# Patient Record
Sex: MALE | Race: WHITE
[De-identification: names, ages, dates, MRNs, and addresses within clinical notes are randomized per-mention and may not be internally consistent; named-entity substitution may affect disease eponyms.]

---

## 2022-05-10 ENCOUNTER — HOSPITAL ENCOUNTER (EMERGENCY)
Dept: HOSPITAL 61 - ER | Age: 42
Discharge: HOME | End: 2022-05-10
Payer: SELF-PAY

## 2022-05-10 VITALS — HEIGHT: 74 IN | BODY MASS INDEX: 28.12 KG/M2 | WEIGHT: 219.14 LBS

## 2022-05-10 VITALS — DIASTOLIC BLOOD PRESSURE: 92 MMHG | SYSTOLIC BLOOD PRESSURE: 151 MMHG

## 2022-05-10 DIAGNOSIS — Z20.2: ICD-10-CM

## 2022-05-10 DIAGNOSIS — R30.0: Primary | ICD-10-CM

## 2022-05-10 DIAGNOSIS — R36.9: ICD-10-CM

## 2022-05-10 LAB
BACTERIA #/AREA URNS HPF: (no result) /HPF
RBC #/AREA URNS HPF: (no result) /HPF (ref 0–2)
WBC #/AREA URNS HPF: (no result) /HPF (ref 0–4)

## 2022-05-10 PROCEDURE — 96372 THER/PROPH/DIAG INJ SC/IM: CPT

## 2022-05-10 PROCEDURE — 99283 EMERGENCY DEPT VISIT LOW MDM: CPT

## 2022-05-10 PROCEDURE — 81001 URINALYSIS AUTO W/SCOPE: CPT

## 2022-05-10 PROCEDURE — 87591 N.GONORRHOEAE DNA AMP PROB: CPT

## 2022-05-10 PROCEDURE — 87491 CHLMYD TRACH DNA AMP PROBE: CPT

## 2022-05-10 PROCEDURE — 87086 URINE CULTURE/COLONY COUNT: CPT

## 2022-05-10 NOTE — PHYS DOC
Past Medical History


Past Surgical History:  No Surgical History





General Adult


EDM:


Chief Complaint:  SEXUALLY TRANSMITTED DISEASE





HPI:


HPI:





Patient is a 41  year old male presenting to the ED today complaining of 

dysuria, foul-smelling yellow penile discharge, symptoms began a week ago.  

Patient is in the ED with a new girlfriend who is being seen for STDs as well.





Review of Systems:


Review of Systems:


Constitutional:   Denies fever or chills. []


GI:   Denies abdominal pain, nausea, vomiting, bloody stools or diarrhea. [] 


: Reports dysuria and foul-smelling penile discharge


Musculoskeletal:   Denies back pain or joint pain. [] 


Integument:   Denies rash. [] 


Neurologic:   Denies headache, focal weakness or sensory changes. [] 


Psychiatric:  Denies depression or anxiety. []





Heart Score:


C/O Chest Pain:  N/A


Risk Factors:


Risk Factors:  DM, Current or recent (<one month) smoker, HTN, HLP, family 

history of CAD, obesity.


Risk Scores:


Score 0 - 3:  2.5% MACE over next 6 weeks - Discharge Home


Score 4 - 6:  20.3% MACE over next 6 weeks - Admit for Clinical Observation


Score 7 - 10:  72.7% MACE over next 6 weeks - Early Invasive Strategies





Current Medications:





Current Medications








 Medications


  (Trade)  Dose


 Ordered  Sig/Minesh  Start Time


 Stop Time Status Last Admin


Dose Admin


 


 Ceftriaxone Sodium


  (Rocephin Im)  500 mg  1X  ONCE  5/10/22 21:15


 5/10/22 21:16 UNV  





 


 Doxycycline


 Hyclate


  (Vibra-Tab)  100 mg  1X  ONCE  5/10/22 21:15


 5/10/22 21:16 UNV  





 


 Metronidazole


  (Flagyl)  2,000 mg  1X  ONCE  5/10/22 21:15


 5/10/22 21:16 UNV  














Allergies:


Allergies:





Allergies








Coded Allergies Type Severity Reaction Last Updated Verified


 


  No Known Drug Allergies    5/10/22 No











Physical Exam:


PE:





Constitutional: Well developed, well nourished, no acute distress, non-toxic 

appearance. []]


Abdomen: Bowel sounds normal, soft, no tenderness, no masses, no pulsatile 

masses. [] 


Skin: Warm, dry, no erythema, no rash. [] 


Back: No tenderness, no CVA tenderness. [] 


Extremities: No tenderness, no cyanosis, no clubbing, ROM intact, no edema. [] 


Neurologic: Alert and oriented X 3, normal motor function, normal sensory 

function, no focal deficits noted. []


Psychologic: Affect normal, judgement normal, mood normal. []





Current Patient Data:


Vital Signs:





                                   Vital Signs








  Date Time  Temp Pulse Resp B/P (MAP) Pulse Ox O2 Delivery O2 Flow Rate FiO2


 


5/10/22 20:58 98.0 115 20 151/92 (111) 96 Room Air  





 98.0       











EKG:


EKG:


[]





Radiology/Procedures:


Radiology/Procedures:


[]





Course & Med Decision Making:


Course & Med Decision Making


Pertinent Labs and Imaging studies reviewed. (See chart for details)





This a 41-year-old male patient presenting to the ED today with STD concerns.  

Patient has foul-smelling discharge with dysuria for a week.  Was treated for 

STDs.  Education provided as well as return precautions





Dragon Disclaimer:


Dragon Disclaimer:


This electronic medical record was generated, in whole or in part, using a voice

 recognition dictation system.





Departure


Departure


Impression:  


   Primary Impression:  


   Concern about STD in male without diagnosis


Disposition:  01 HOME / SELF CARE / HOMELESS


Condition:  STABLE


Patient Instructions:  Sexually Transmitted Disease





Additional Instructions:  


You were treated for sexually transmitted diseases, ensure you complete the 

prescribed antibiotics.  Do not have any sex for 1 week.  Use protection all the

 time.  Please let your partners know you were treated for STDs and ask them to 

seek treatment.  Complete the prescribed antibiotics


Scripts


Doxycycline Hyclate (DOXYCYCLINE HYCLATE) 100 Mg Tablet


1 TAB PO BID, #14 TAB


   Prov: ADAIR CHURCH         5/10/22











ADAIR CHURCH            May 10, 2022 21:17